# Patient Record
Sex: MALE
[De-identification: names, ages, dates, MRNs, and addresses within clinical notes are randomized per-mention and may not be internally consistent; named-entity substitution may affect disease eponyms.]

---

## 2017-05-15 NOTE — PCM.SURG1
Surgeon's Initial Post Op Note





- Surgeon's Notes


Surgeon: Edu


Assistant:  [email or web address]


Type of Anesthesia: General Endo


Pre-Operative Diagnosis: Incarcerated umbilical hernia


Operative Findings: Incarcerated umbilical hernia containing omentum


Post-Operative Diagnosis: Incarcerated umbilical hernia


Operation Performed: Primary umbilical hernia repair


Specimen/Specimens Removed: hernia sack with omentum


Estimated Blood Loss: EBL {In ML}: 5


Blood Products Given: N/A


Drains Used: No Drains


Post-Op Condition: Good


Date of Surgery/Procedure: 05/15/17


Time of Surgery/Procedure: 07:45

## 2017-05-15 NOTE — OP
PROCEDURE DATE: 05/15/2017



The patient has an umbilical hernia that is rather large.  It could not be reduced.



PREOPERATIVE DIAGNOSIS:  Umbilical hernia.



POSTOPERATIVE DIAGNOSIS:  Umbilical hernia.



OPERATION PERFORMED:  Umbilical herniorrhaphy.



SURGEON:  Hola Sorensen MD.



ASSISTANT:  The resident.



DESCRIPTION OF PROCEDURE:  In the operating room, the patient, having been identified, an infraumbili
alexandra incision was made through the skin and subcutaneous tissues that was enlarged a little bit.  This
 was taken down to the fascia, and the umbilicus was  from the stalk.  In so doing, pretty m
uch the entire hernia was dissected.  Blunt dissection went around it to complete it.  We could not r
educe it, and the sac was then opened - the sac removed with the omentum, which was cauterized as nec
essary.  This went back very nicely.  This showed it to be a very small hernia opening.  This was edinson
sed with a mattress stitch of #1 Prolene x 2.  Complete dissection was achieved.  There was nothing e
lse untoward.  Prior to tying these 2 stitches simultaneously, a finger was placed in, and there was 
no bleeding and no obstruction.  Subcutaneous tissues were closed with Vicryl.  Skin was closed with 
subcuticular,   light pressure dressing.  The patient was taken to recovery room in good condition af
ter the sponge and needle counts were declared correct.





__________________________________________

Hola Sorensen MD







cc:



DD: 05/15/2017 11:32:38  [id number]

TT: 05/15/2017 12:32:28

Job # [id number]

jn

## 2019-02-28 ENCOUNTER — HOSPITAL ENCOUNTER (INPATIENT)
Dept: HOSPITAL 42 - ED | Age: 79
LOS: 3 days | Discharge: HOME | DRG: 565 | End: 2019-03-03
Attending: INTERNAL MEDICINE | Admitting: INTERNAL MEDICINE
Payer: MEDICARE

## 2019-02-28 VITALS — BODY MASS INDEX: 34.9 KG/M2

## 2019-02-28 DIAGNOSIS — M25.462: Primary | ICD-10-CM

## 2019-02-28 DIAGNOSIS — S83.512A: ICD-10-CM

## 2019-02-28 DIAGNOSIS — I10: ICD-10-CM

## 2019-02-28 DIAGNOSIS — Z79.82: ICD-10-CM

## 2019-02-28 DIAGNOSIS — Z79.899: ICD-10-CM

## 2019-02-28 DIAGNOSIS — Z95.5: ICD-10-CM
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
## 2019-02-28 DIAGNOSIS — I25.10: ICD-10-CM

## 2019-02-28 DIAGNOSIS — Z79.02: ICD-10-CM

## 2019-02-28 DIAGNOSIS — M25.062: ICD-10-CM
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
## 2019-02-28 DIAGNOSIS — S80.02XA: ICD-10-CM

## 2019-02-28 DIAGNOSIS — I48.2: ICD-10-CM

## 2019-02-28 DIAGNOSIS — E11.9: ICD-10-CM

## 2019-02-28 DIAGNOSIS — S76.112A: ICD-10-CM

## 2019-02-28 LAB
APTT BLD: 29.4 SECONDS (ref 26.9–38.3)
BASOPHILS # BLD AUTO: 0.02 K/MM3 (ref 0–2)
BASOPHILS NFR BLD: 0.1 % (ref 0–3)
BUN SERPL-MCNC: 18 MG/DL (ref 7–21)
CALCIUM SERPL-MCNC: 7.8 MG/DL (ref 8.4–10.5)
EOSINOPHIL # BLD: 0.2 10*3/UL (ref 0–0.7)
EOSINOPHIL NFR BLD: 1.4 % (ref 1.5–5)
ERYTHROCYTE [DISTWIDTH] IN BLOOD BY AUTOMATED COUNT: 13.2 % (ref 11.5–14.5)
GFR NON-AFRICAN AMERICAN: > 60
HGB BLD-MCNC: 13.8 G/DL (ref 14–18)
INR PPP: 1.06
LYMPHOCYTES # BLD: 4.9 10*3/UL (ref 1.2–3.4)
LYMPHOCYTES NFR BLD AUTO: 32.9 % (ref 22–35)
MCH RBC QN AUTO: 30.5 PG (ref 25–35)
MCHC RBC AUTO-ENTMCNC: 34.2 G/DL (ref 31–37)
MCV RBC AUTO: 89 FL (ref 80–105)
MONOCYTES # BLD AUTO: 0.5 10*3/UL (ref 0.1–0.6)
MONOCYTES NFR BLD: 3.3 % (ref 1–6)
PLATELET # BLD: 232 10^3/UL (ref 120–450)
PMV BLD AUTO: 10.2 FL (ref 7–11)
PROTHROMBIN TIME: 11.8 SECONDS (ref 9.4–12.5)
RBC # BLD AUTO: 4.53 10^6/UL (ref 3.5–6.1)
WBC # BLD AUTO: 14.8 10^3/UL (ref 4.5–11)

## 2019-02-28 RX ADMIN — POTASSIUM CHLORIDE SCH MEQ: 20 TABLET, EXTENDED RELEASE ORAL at 23:07

## 2019-02-28 NOTE — ED PDOC
Arrival/HPI





- General


Chief Complaint: Lower Extremity Problem/Injury


Time Seen by Provider: 02/28/19 17:02


Historian: Patient, Family (Daughter at bedside helped provide information)





- History of Present Illness


Narrative History of Present Illness (Text): 





02/28/19 17:15


78 M with PMHx of falls, presents with cc of left knee pain status post slipping

and falling on ice around 16:30 prior to arrival.  Pt reports it felt like his 

knee cap "went off to the right side." Patient notes he is not able to put any 

weight on that leg. Patient notes mild pain to left upper thigh. Daughter at 

bedside notes patient is on blood thinners. Daughter notes if pain medication is

needed, to avoid Percocet because it makes patient constipated. Patient denies 

any other complaints. 





PMD: Serafin Vasquez





Time/Duration: Prior to Arrival (Patient notes slip and fall from 16:30 today 

prior to arrival)


Symptom Onset: Sudden


Symptom Course: Unchanged


Context: Slipped (Patient notes he slipped and fell on ice )





Past Medical History





- Provider Review


Nursing Documentation Reviewed: Yes





- Infectious Disease


Hx of Infectious Diseases: None





- Cardiac


Hx Hypertension: Yes


Hx Pacemaker: No


Other/Comment: Cardiac cath x 3 stents





- Neurological


Hx Paralysis: No





- Endocrine/Metabolic


Hx Diabetes Mellitus Type 2: Yes





- Hematological/Oncological


Hx Blood Transfusions: No


Hx Blood Transfusion Reaction: No





- Musculoskeletal/Rheumatological


Hx Musculoskeletal Disorders: Yes





- Psychiatric


Hx Emotional Abuse: No


Hx Physical Abuse: No


Hx Substance Use: No





- Surgical History


Other/Comment: Cardiac cath x 3 stents





- Anesthesia


Hx Anesthesia Reactions: Yes (NAUSEA/VOMITING)


Hx Malignant Hyperthermia: No





- Suicidal Assessment


Feels Threatened In Home Enviroment: No





Family/Social History





- Physician Review


Nursing Documentation Reviewed: Yes


Family/Social History: No Known Family HX


Smoking Status: Never Smoked


Hx Alcohol Use: No


Hx Substance Use: No





Allergies/Home Meds


Allergies/Adverse Reactions: 


Allergies





nitroglycerin Allergy (Intermediate, Verified 02/28/19 17:14)


   MIGRAINES








Home Medications: 


                                    Home Meds











 Medication  Instructions  Recorded  Confirmed


 


Aspirin [Ecotrin] 325 mg PO QAM 05/17/16 02/28/19


 


Atorvastatin [Lipitor] 80 mg PO DAILY 05/17/16 02/28/19


 


Carvedilol [Coreg] 6.25 mg PO BID 05/17/16 02/28/19


 


Celecoxib [Celebrex] 200 mg PO DAILY 05/17/16 02/28/19


 


Olmesartan/Amlodipin/Hcthiazid 1 tab PO QAM 05/17/16 02/28/19





[Tribenzor 5 mg-25 mg-40 mg]   


 


Potassium Chloride [Klor-Con M20] 20 meq PO QPM 05/17/16 02/28/19


 


Ranolazine [Ranexa] 500 mg PO QAM 05/17/16 02/28/19


 


Famotidine [Pepcid] 20 mg PO BID 05/18/16 02/28/19


 


Prasugrel [Effient] 1 tab PO DAILY 02/28/19 02/28/19














Review of Systems





- Physician Review


All systems were reviewed & negative as marked: Yes (All other systems negative 

except that noted in the HPI.)





Physical Exam





- Physical Exam


Narrative Physical Exam (Text): 





Gen:  VS reviewed, alert, well developed, well nourished, nontoxic, mild 

distress 


Eye: EOMI, PERRL 


Neck: no JVD, supple, no adenopathy


CV: regular rate, regular rhythm, no rubs,no murmur, S1, S2


Pulm: no distress, clear to auscultation, no wheeze, no rhonchi, breath sounds 

equal, no rales


Abd: soft, nontender, no guarding, no rebound, no rigidity


Ext: Trace edema in bilateral lower extremities. Moderate effusions of left 

knee. Limited ROM of left knee, secondary to pain. Patella seems to be midline. 

Mild tenderness of distal anterior femur.


Skin: good color, no rash, no cyanosis


Psych: responds appropriately to questions, normal affect


Neuro: oriented x3, CN2-12 intact grossly, motor intact, sensation intact





Vital Signs Reviewed: Yes





Vital Signs











  Temp Pulse Resp BP Pulse Ox


 


 02/28/19 16:49  97.8 F  61  18  160/85 H  96











Temperature: Afebrile


Blood Pressure: Hypertensive


Pulse: Regular


Respiratory Rate: Normal


Appearance: Positive for: Well-Appearing, Non-Toxic


Pain Distress: Mild


Mental Status: Positive for: Alert and Oriented X 3





Medical Decision Making


ED Course and Treatment: 





02/28/19 


Impression:


78 year old male who presents to the Emergency department for left knee pain 

status post slipping and falling on ice around 16:30 prior to arrival. 





Differential Diagnosis included but are not limited to:  





Plan:


-- EKG


-- Labs


-- Pack ice


-- X-ray of left femur


-- X-ray of left knee with patellla


-- Reassess and disposition





Prior Visits:


Notes and results from previous visits were reviewed. 





Progress Notes:











02/28/19 19:55


d/w JULIOCESAR almeida covering with dr. osorio, accept admit. patient to be 

admitted for traumatic knee effusion, unable to bear weight, fall risk and 

cannot function at home. will need ortho consult, pain control, possible PT


02/28/19 20:55


case discussed with dr. covington, will see patient in consultation





- RAD Interpretation


Narrative RAD Interpretations (Text): 





02/28/19





X-ray of left femur interpreted by me, shows: 





No fracture. 


------------------------------------------------------

-----------------------------------------------------------------


X-ray of left knee interpreted by me, shows:





No fracture or dislocation. 











Radiology Orders: 











02/28/19 17:21


Femur Left [FEMUR MIN 2 VIEWS LT] [RAD] Stat 





02/28/19 17:22


KNEE WITH PATELLA LEFT 3 VIEW [RAD] Stat 











: ED Physician





- EKG Interpretation


EKG Interpretation (Text): 





02/28/19 19:45


1754: sinus mis at 58 bpm, rbbb, nonspecific t wave abn


Interpreted by ED Physician: Yes





- Scribe Statement


The provider has reviewed the documentation as recorded by the Scribe





Hyacinth Stock 





All medical record entries made by the Scribe were at my direction and per

sonally dictated by me. I have reviewed the chart and agree that the record 

accurately reflects my personal performance of the history, physical exam, 

medical decision making, and the department course for this patient. I have also

 personally directed, reviewed, and agree with the discharge instructions and 

disposition.





Disposition/Present on Arrival





- Present on Arrival


Any Indicators Present on Arrival: No


History of DVT/PE: No


History of Uncontrolled Diabetes: No


Urinary Catheter: No


History of Decub. Ulcer: No


History Surgical Site Infection Following: None





- Disposition


Have Diagnosis and Disposition been Completed?: Yes


Diagnosis: 


 Effusion of knee





Disposition: HOSPITALIZED


Disposition Time: 19:55


Patient Plan: Admission


Condition: STABLE

## 2019-03-01 RX ADMIN — POTASSIUM CHLORIDE SCH MEQ: 20 TABLET, EXTENDED RELEASE ORAL at 18:14

## 2019-03-01 RX ADMIN — POTASSIUM CHLORIDE SCH MEQ: 20 TABLET, EXTENDED RELEASE ORAL at 11:12

## 2019-03-01 NOTE — CARD
--------------- APPROVED REPORT --------------





Date of service: 02/28/2019



EKG Measurement

Heart [id number]

LA [id number]

[id number]

[id number]

[id number]



<Conclusion>

Sinus bradycardia with sinus arrhythmia with 1st degree AV block

Right bundle branch block

Abnormal ECG

## 2019-03-01 NOTE — RAD
Date of service: 



02/28/2019



PROCEDURE:  Left Femur Radiographs.



HISTORY:

fall, injury



COMPARISON:

None.



TECHNIQUE:

AP and Lateral Radiographs of the left femur.



FINDINGS:



FEMUR:

Normal. No fracture. 



SOFT TISSUES:

Normal. 



OTHER FINDINGS:

None.



IMPRESSION:

Unremarkable radiographs of the left femur.

## 2019-03-01 NOTE — RAD
Date of service: 



02/28/2019



PROCEDURE:  Left Knee Radiographs.



HISTORY:

Pain.



COMPARISON:

None.



FINDINGS:



BONES:

Normal. No fracture. 



JOINTS:

Normal. No osteoarthritis. 



JOINT EFFUSION:

None. 



OTHER FINDINGS:

Soft tissue swelling above the patella



IMPRESSION:

No evidence of fracture

## 2019-03-01 NOTE — HP
DATE OF EXAM:  03/01/2019



HISTORY OF PRESENT ILLNESS:  A 78-year-old white male with history of

hypertension, CAD, status post multiple stents, history of gout and

degenerative arthritis.  The patient recently found on day of admission,

slipping on the ice and  injuring his left knee.  The patient was unable to

ambulate after the fall, then he started to swell traumatically,

eventually, the patient because was unable to ambulate was brought to the

hospital for evaluation.  The patient is unable to bear weight and

complaining of pain and swelling in the left knee.



REVIEW OF SYSTEMS:

CARDIAC:  Negative for syncope, palpitations, chest pain, shortness of

breath.

RESPIRATORY:  Negative for cough, sputum production or dyspnea.

NEUROLOGICAL:  Negative for lightheadedness, dizziness, headache or

syncope.

GI:  Negative for nausea, vomiting and diarrhea.  He is negative for

dysuria, hematuria and frequency.

MUSCULOSKELETAL:  Positive for pain and swelling of the left knee.  The

patient also recently had an episode of infection versus gout of the third

toe of the left foot.



SOCIAL HISTORY:  The patient is a nonsmoker and nondrinker.



MEDICATIONS:  He is on multiple medications including Effient and aspirin

for his coronary artery disease.  _____



He has no travel history.



FAMILY HISTORY:  There is no family history that is pertinent.



PHYSICAL EXAMINATION

GENERAL:  Shows well-developed,but obese white male in mild distress.

HEENT:  Essentially within normal limits.

HEART:  Regular sinus rhythm.  No S3 or murmurs.

CHEST:  Clear to auscultation and percussion.

ABDOMEN:  Obese, but benign.

EXTREMITIES:  Without cyanosis, clubbing or edema.  There is healing

erythema of the third toe of the left foot.  There is marked swelling,

ecchymosis and fluctuance of the left knee without _____ and/or erythema. 

There is decreased range of motion.  There is some crepitus on movement of

the knee.



IMPRESSION AND PLAN:  Contusion of the left knee with marked swelling and

bleeding, the patient is on blood thinners.  The patient has a history of

surgery of his left shoulder and arthroscopic surgery of his right knee and

he also had other viscous injection, _____ injections of his left knee. 

Rule out fracture, rule out internal derangement, rule out hemarthrosis of

the knee.







__________________________________________

Serafin Puga MD





DD:  03/01/2019 8:03:06

DT:  03/01/2019 8:41:10

Job # [id number]

## 2019-03-01 NOTE — CON
DATE:  03/01/2019



INPATIENT CONSULTATION NOTE



REASON FOR CONSULTATION:  Status post fall with left knee pain and

effusion.



HISTORY OF PRESENT ILLNESS:  This is a 78-year-old gentleman who yesterday

slipped and fell injuring his left knee.  He said his left leg appear to be

twisted to the side and felt like his knee cap was to the side. 

Subsequently, he went to the hospital via EMS and was noted to have a very

large effusion.  The patient denies any other injuries.  The patient states

he is unable to bear weight on it secondary to pain.



PHYSICAL EXAMINATION:

GENERAL:  This is a gentleman in no apparent distress.  He is awake, alert,

and oriented x3.

EXTREMITIES:  Evaluation of the left knee shows a large suprapatellar

effusion.  He does have some ecchymosis anteriorly.  He has some tenderness

along the proximal aspect of the patella and the medial aspect of the

patella as well.  He is unable to perform a straight leg raise.  He is

grossly stable with varus valgus test.  Grossly stable with Lachman.  His

thigh and calf are soft and nontender.  Neurovascularly, he is grossly

intact.  He has palpable pedal pulses.



DIAGNOSTIC DATA:  X-ray and CAT scan of the left knee showed no acute

fracture or dislocation.  The patella does not appear to be high or _____

low consistent with either patella or quadriceps tendon tear.  Some

degenerative changes are noted.



ASSESSMENT:  Status post fall with left knee effusion.



PLAN:  At this point, I recommended ice and knee mobilizer.  Also,

recommended weightbearing as tolerated and we will order an MRI of the left

knee.  We will followup.







__________________________________________

David Bowman MD





DD:  03/01/2019 14:13:20

DT:  03/01/2019 19:07:40

Job # [id number]

## 2019-03-01 NOTE — CP.PCM.PN
Subjective





- Date & Time of Evaluation


Date of Evaluation: 03/01/19


Time of Evaluation: 14:19





- Subjective


Subjective: 





Patient has been afebrile. Tolerating therapy well. Plan to go home tomorrow





Dressings changed. Incision clean dry and intact with sutures in place. No 

erythema or drainage. Calf and thigh soft and nontender. NVI distally





s/p R TKA


Cont PT


Cont DVT prophylaxis


Discharge tomorrow to home with home PT or outpatient therapy


Patient to follow up in office on Thursday








Objective





- Vital Signs/Intake and Output


Vital Signs (last 24 hours): 


                                        











Temp Pulse Resp BP Pulse Ox


 


 97.3 F L  61   20   155/88 H  93 L


 


 03/01/19 08:00  03/01/19 11:13  03/01/19 08:00  03/01/19 11:13  03/01/19 08:00











- Medications


Medications: 


                               Current Medications





Amlodipine Besylate (Norvasc)  5 mg PO DAILY [facility]


   Last Admin: 03/01/19 11:12 Dose:  5 mg


Atorvastatin Calcium (Lipitor)  80 mg PO DAILY [facility]


   Last Admin: 03/01/19 11:14 Dose:  80 mg


Carvedilol (Coreg)  6.25 mg PO BID [facility]


   Last Admin: 03/01/19 11:13 Dose:  6.25 mg


Famotidine (Pepcid)  20 mg PO BID [facility]


   Last Admin: 03/01/19 11:14 Dose:  20 mg


Hydrochlorothiazide (Hydrodiuril)  25 mg PO DAILY [facility]


   Last Admin: 03/01/19 11:14 Dose:  25 mg


Losartan Potassium (Cozaar)  100 mg PO DAILY [facility]


   Last Admin: 03/01/19 11:12 Dose:  100 mg


Non-Formulary Medication (Ranolazine [Ranexa])  500 mg PO QAM [facility]


Potassium Chloride (K-Dur 20 Meq Er Tab)  20 meq PO DAILY [facility]


   Last Admin: 03/01/19 11:12 Dose:  20 meq


Potassium Chloride (K-Dur 20 Meq Er Tab)  20 meq PO QPM [facility]


Tramadol HCl (Ultram)  50 mg PO TID PRN


   PRN Reason: Pain, severe (8-10)


   Last Admin: 03/01/19 05:23 Dose:  50 mg











- Labs


Labs: 


                                        





                                 02/28/19 19:07 





                                 02/28/19 19:07 





                                        











PT  11.8 SECONDS (9.4-12.5)   02/28/19  19:07    


 


INR  1.06   02/28/19  19:07    


 


APTT  29.4 Seconds (26.9-38.3)   02/28/19  19:07

## 2019-03-01 NOTE — CT
Date of service: 



02/28/2019



PROCEDURE:  



HISTORY:

trauma



COMPARISON:





TECHNIQUE:





FINDINGS:

No acute fracture.  Marked anterior prepatellar soft tissue swelling 

as well as a large joint effusion.  Mild-to-moderate tricompartmental 

osteoarthritis.  Cannot exclude a partial tear of the quadriceps 

tendon.  Vascular calcifications. 



IMPRESSION:

No acute fracture.  Large joint effusion and prepatellar soft tissue 

swelling.

## 2019-03-02 LAB
ALBUMIN SERPL-MCNC: 3.5 G/DL (ref 3–4.8)
ALBUMIN/GLOB SERPL: 1.4 {RATIO} (ref 1.1–1.8)
ALT SERPL-CCNC: 14 U/L (ref 7–56)
AST SERPL-CCNC: 30 U/L (ref 17–59)
BUN SERPL-MCNC: 25 MG/DL (ref 7–21)
CALCIUM SERPL-MCNC: 9.4 MG/DL (ref 8.4–10.5)
ERYTHROCYTE [DISTWIDTH] IN BLOOD BY AUTOMATED COUNT: 13.6 % (ref 11.5–14.5)
GFR NON-AFRICAN AMERICAN: 54
HGB BLD-MCNC: 12 G/DL (ref 14–18)
MCH RBC QN AUTO: 29.9 PG (ref 25–35)
MCHC RBC AUTO-ENTMCNC: 33 G/DL (ref 31–37)
MCV RBC AUTO: 90.8 FL (ref 80–105)
PLATELET # BLD: 217 10^3/UL (ref 120–450)
PMV BLD AUTO: 10.4 FL (ref 7–11)
RBC # BLD AUTO: 4.01 10^6/UL (ref 3.5–6.1)
WBC # BLD AUTO: 10.8 10^3/UL (ref 4.5–11)

## 2019-03-02 RX ADMIN — POTASSIUM CHLORIDE SCH MEQ: 20 TABLET, EXTENDED RELEASE ORAL at 18:41

## 2019-03-02 RX ADMIN — POTASSIUM CHLORIDE SCH MEQ: 20 TABLET, EXTENDED RELEASE ORAL at 09:35

## 2019-03-02 NOTE — PN
DATE:  03/02/2019





SUBJECTIVE:  A 78-year-old white male, status post fall, injuring the left

knee.  The patient was seen in consultation with Dr. Guzman.  He had

attempted arthrocentesis without significant effusion drained.  CT showed

large effusion of the knee without fracture.  The patient has marked

swelling and erythema of the left knee and surrounding soft tissues and

some swelling.  He has minimal pain.  The patient is having an MRI today to

rule out a tear of the quadriceps muscle.  The patient did start physical

therapy and occupational therapy and was also taught to use crutches and do

stairs.  He will be discharged home if the MRI is negative.  He will follow

as an outpatient _____.







__________________________________________

Serafin Puga MD





DD:  03/02/2019 9:03:10

DT:  03/02/2019 10:00:38

Job # [id number]

## 2019-03-03 VITALS
TEMPERATURE: 97.9 F | DIASTOLIC BLOOD PRESSURE: 69 MMHG | OXYGEN SATURATION: 95 % | SYSTOLIC BLOOD PRESSURE: 130 MMHG | RESPIRATION RATE: 18 BRPM | HEART RATE: 60 BPM

## 2019-03-03 RX ADMIN — POTASSIUM CHLORIDE SCH MEQ: 20 TABLET, EXTENDED RELEASE ORAL at 10:24

## 2019-03-03 NOTE — DS
HISTORY OF PRESENT ILLNESS:  A 78-year-old white male with history of

chronic atrial fibrillation, hypertension, CAD status post stents, status

post fall with trauma to the left knee.  He was seen in consultation with

Dr. Bowman.  The patient had a CT and also an MRI, which revealed a full

thickness ACL tear and a tear of the patellar tendon.  The patient has

marked swelling and erythema.  The patient had been on Effient because of

his chronic atrial fibrillation.  Dr. Bowman attempted a drainage, but

there was no blood or fluid in the joint.  The patient was started on

physical therapy, occupational therapy, had severe pain and difficulty

ambulating.  The patient will be scheduling for surgery now and he could be

discharged home today with a walker and crutches.  He does have some _____

at home.  He will hold his Effient until the surgery and he will be

followed as out for preoperative surgery later on this week.



DISCHARGE DIAGNOSES:

1.  Fall resulting in tear of the left anterior cruciate ligament and

patellar tendon.

2.  Hemarthrosis of the left knee.

3.  Chronic atrial fibrillation.

4.  Coronary artery disease.







__________________________________________

Serafin Puga MD





DD:  03/03/2019 11:32:17

DT:  03/03/2019 15:05:25

Job # [id number]

## 2019-03-03 NOTE — RAD
HISTORY:

 pre-op 



COMPARISON:

Chest x-ray performed 5/2/17 



TECHNIQUE:

Chest, one view.



FINDINGS:

Examination limited by habitus and hypoinflation.



LUNGS:

Right basilar atelectasis.



Please note that chest x-ray has limited sensitivity for the 

detection of pulmonary masses.



PLEURA:

No significant pleural effusion identified. No definite pneumothorax .



CARDIOVASCULAR:

Heart size appears within normal limits.  Atherosclerotic 

calcifications aorta.  The patient's chin obscures evaluation of lung 

apices. 



OSSEOUS STRUCTURES:

Degenerative changes.



VISUALIZED UPPER ABDOMEN:

Elevation of the right hemidiaphragm



OTHER FINDINGS:

None.



IMPRESSION:

Right basilar atelectasis.

## 2019-03-04 NOTE — MRI
Date of service: 



03/02/2019



PROCEDURE:  MRI Left Knee



HISTORY:

Pain. 



COMPARISON:

None available. 



TECHNIQUE:

Multiecho multiplanar sequences were performed through the left knee.



FINDINGS:



ANTERIOR CRUCIATE LIGAMENT::

Intact. 



POSTERIOR CRUCIATE LIGAMENT::

Intact. 



MEDIAL MENISCUS::

There is a tear in the body and posterior horn of the medial 

meniscus. 



LATERAL MENISCUS::

Intact. 



MEDIAL COLLATERAL LIGAMENT::

Intact. 



LATERAL COLLATERAL LIGAMENT COMPLEX::

Intact. 



QUADRICEPS TENDON::

There is a complete tear of the quadriceps tendon just above the 

patella.  There is a large amount of soft tissue swelling and edema. 



PATELLAR TENDON::

Intact. 



CARTILAGE::

Thinning of the patellar cartilage 



JOINT FLUID::

Small joint effusion 



OSSEOUS STRUCTURES::

Intact. 



OTHER FINDINGS:

The report concurs with the preliminary [facility] report



IMPRESSION:

There is a complete tear of the quadriceps tendon just above the 

patella.  There is a large amount of soft tissue swelling and edema.



There is a tear in the body and posterior horn of the medial meniscus.

## 2019-03-08 ENCOUNTER — HOSPITAL ENCOUNTER (OUTPATIENT)
Dept: HOSPITAL 42 - SDS | Age: 79
End: 2019-03-08
Payer: MEDICARE

## 2019-03-08 DIAGNOSIS — S76.112A: Primary | ICD-10-CM

## 2021-05-17 NOTE — CP.PCM.PCO
Here is the plan from today's visit    1. Acute prostatitis  Please call urology office to see if the surgery should be delayed.   - levofloxacin (LEVAQUIN) 500 MG tablet; Take 1 tablet (500 mg) by mouth daily  Dispense: 42 tablet; Refill: 0  - Urine Culture Aerobic Bacterial      Please call or return to clinic if your symptoms don't go away.            Thank you for coming to [facility] today.  Lab Testing:  **If you had lab testing today and your results are reassuring or normal they will be mailed to you or sent through [employer] within 7 days.   **If the lab tests need quick action we will call you with the results.  The phone number we will call with results is # [phone] (home) . If this is not the best number please call our clinic and change the number.  Medication Refills:  If you need any refills please call your pharmacy and they will contact us.   If you need to  your refill at a new pharmacy, please contact the new pharmacy directly. The new pharmacy will help you get your medications transferred faster.   Scheduling:  If you have any concerns about today's visit or wish to schedule another appointment please call our office during normal business hours [phone] (8-5:00 M-F)   eferrals to [facility] please call [phone].   Mammogram Scheduling [phone]     XRay/CT/Ultrasound/MRI Scheduling [phone]    Medical Concerns:  If you have urgent medical concerns please call [phone] at any time of the day.    Urban Perry MD     Physician Communication Note





- Physician Communication Note


Physician Communication Note: s/p drainage left knee effusion,awaiting MRI left 

knee r/o quad tear,PT [place]

## 2022-05-17 ENCOUNTER — NEW PATIENT COMPREHENSIVE (OUTPATIENT)
Dept: URBAN - METROPOLITAN AREA CLINIC 21 | Facility: CLINIC | Age: 82
End: 2022-05-17

## 2022-05-17 DIAGNOSIS — H43.393: ICD-10-CM

## 2022-05-17 DIAGNOSIS — H25.13: ICD-10-CM

## 2022-05-17 DIAGNOSIS — H35.033: ICD-10-CM

## 2022-05-17 DIAGNOSIS — H35.3130: ICD-10-CM

## 2022-05-17 PROCEDURE — 92134 CPTRZ OPH DX IMG PST SGM RTA: CPT

## 2022-05-17 PROCEDURE — 92020 GONIOSCOPY: CPT

## 2022-05-17 PROCEDURE — 92004 COMPRE OPH EXAM NEW PT 1/>: CPT

## 2022-05-17 PROCEDURE — 92136 OPHTHALMIC BIOMETRY: CPT

## 2022-05-17 PROCEDURE — 92201 OPSCPY EXTND RTA DRAW UNI/BI: CPT

## 2022-05-17 ASSESSMENT — VISUAL ACUITY
OD_PH: 20/40
OS_CC: 20/40
OD_CC: J1+/-
OS_PH: 20/30
OD_CC: 20/80
OS_CC: J1+

## 2022-05-17 ASSESSMENT — TONOMETRY
OD_IOP_MMHG: 20
OS_IOP_MMHG: 19

## 2022-11-21 ENCOUNTER — SURGERY/PROCEDURE (OUTPATIENT)
Dept: URBAN - METROPOLITAN AREA SURGICAL CENTER 32 | Facility: SURGICAL CENTER | Age: 82
End: 2022-11-21

## 2022-11-21 DIAGNOSIS — H25.11: ICD-10-CM

## 2022-11-21 PROBLEM — Z96.1: Noted: 2022-11-21

## 2022-11-21 PROBLEM — Z96.1 PSEUDOPHAKIA OD: Noted: 2022-11-21

## 2022-11-21 PROCEDURE — 66984 XCAPSL CTRC RMVL W/O ECP: CPT

## 2022-11-22 ENCOUNTER — 1 DAY POST-OP (OUTPATIENT)
Dept: URBAN - METROPOLITAN AREA CLINIC 21 | Facility: CLINIC | Age: 82
End: 2022-11-22

## 2022-11-22 DIAGNOSIS — Z96.1: ICD-10-CM

## 2022-11-22 PROCEDURE — 99024 POSTOP FOLLOW-UP VISIT: CPT

## 2022-11-22 ASSESSMENT — TONOMETRY
OD_IOP_MMHG: 19
OS_IOP_MMHG: 18

## 2022-11-22 ASSESSMENT — VISUAL ACUITY: OD_SC: 20/30+2

## 2022-11-29 ENCOUNTER — 1 WEEK POST-OP (OUTPATIENT)
Dept: URBAN - METROPOLITAN AREA CLINIC 21 | Facility: CLINIC | Age: 82
End: 2022-11-29

## 2022-11-29 DIAGNOSIS — Z96.1: ICD-10-CM

## 2022-11-29 PROCEDURE — 99024 POSTOP FOLLOW-UP VISIT: CPT

## 2022-11-29 ASSESSMENT — VISUAL ACUITY
OS_SC: 20/40
OD_SC: 20/30-3

## 2022-11-29 ASSESSMENT — TONOMETRY
OS_IOP_MMHG: 24
OD_IOP_MMHG: 18

## 2022-12-06 ENCOUNTER — POST-OP CHECK (OUTPATIENT)
Dept: URBAN - METROPOLITAN AREA CLINIC 21 | Facility: CLINIC | Age: 82
End: 2022-12-06

## 2022-12-06 DIAGNOSIS — Z96.1: ICD-10-CM

## 2022-12-06 PROCEDURE — 99024 POSTOP FOLLOW-UP VISIT: CPT

## 2022-12-06 ASSESSMENT — TONOMETRY
OS_IOP_MMHG: 18
OD_IOP_MMHG: 18

## 2022-12-06 ASSESSMENT — VISUAL ACUITY
OS_SC: 20/50
OD_SC: 20/40+
OD_SC: J10
OS_SC: J16

## 2022-12-13 ENCOUNTER — ESTABLISHED (OUTPATIENT)
Dept: URBAN - METROPOLITAN AREA CLINIC 21 | Facility: CLINIC | Age: 82
End: 2022-12-13

## 2022-12-13 DIAGNOSIS — Z96.1: ICD-10-CM

## 2022-12-13 PROCEDURE — 99024 POSTOP FOLLOW-UP VISIT: CPT

## 2022-12-13 ASSESSMENT — TONOMETRY
OD_IOP_MMHG: 16
OS_IOP_MMHG: 19

## 2022-12-13 ASSESSMENT — VISUAL ACUITY
OS_SC: 20/50+3
OD_SC: 20/30+2

## 2023-01-23 ENCOUNTER — EMERGENCY VISIT (OUTPATIENT)
Dept: URBAN - METROPOLITAN AREA CLINIC 21 | Facility: CLINIC | Age: 83
End: 2023-01-23

## 2023-01-23 DIAGNOSIS — Z96.1: ICD-10-CM

## 2023-01-23 DIAGNOSIS — H11.32: ICD-10-CM

## 2023-01-23 PROCEDURE — 92012 INTRM OPH EXAM EST PATIENT: CPT

## 2023-01-23 ASSESSMENT — TONOMETRY
OD_IOP_MMHG: 13
OS_IOP_MMHG: 15

## 2023-01-23 ASSESSMENT — VISUAL ACUITY
OD_SC: 20/30+2
OS_SC: 20/50+2

## 2023-03-10 ENCOUNTER — ESTABLISHED (OUTPATIENT)
Dept: URBAN - METROPOLITAN AREA CLINIC 21 | Facility: CLINIC | Age: 83
End: 2023-03-10

## 2023-03-10 DIAGNOSIS — H25.12: ICD-10-CM

## 2023-03-10 DIAGNOSIS — Z96.1: ICD-10-CM

## 2023-03-10 PROCEDURE — 92012 INTRM OPH EXAM EST PATIENT: CPT

## 2023-03-10 PROCEDURE — 92136 OPHTHALMIC BIOMETRY: CPT

## 2023-03-10 ASSESSMENT — TONOMETRY
OS_IOP_MMHG: 15
OD_IOP_MMHG: 12

## 2023-03-10 ASSESSMENT — VISUAL ACUITY
OD_SC: 20/30+3
OS_SC: 20/40-2

## 2023-04-17 ENCOUNTER — SURGERY/PROCEDURE (OUTPATIENT)
Dept: URBAN - METROPOLITAN AREA SURGICAL CENTER 32 | Facility: SURGICAL CENTER | Age: 83
End: 2023-04-17

## 2023-04-17 DIAGNOSIS — H25.12: ICD-10-CM

## 2023-04-17 PROBLEM — Z96.1: Noted: 2023-04-17

## 2023-04-17 PROCEDURE — 66984 XCAPSL CTRC RMVL W/O ECP: CPT

## 2023-04-18 ENCOUNTER — 1 DAY POST-OP (OUTPATIENT)
Dept: URBAN - METROPOLITAN AREA CLINIC 21 | Facility: CLINIC | Age: 83
End: 2023-04-18

## 2023-04-18 DIAGNOSIS — Z96.1: ICD-10-CM

## 2023-04-18 PROCEDURE — 99024 POSTOP FOLLOW-UP VISIT: CPT

## 2023-04-18 ASSESSMENT — VISUAL ACUITY
OD_PH: 20/50
OD_SC: 20/50
OS_SC: 20/40

## 2023-04-18 ASSESSMENT — TONOMETRY: OS_IOP_MMHG: 14

## 2023-04-25 ENCOUNTER — 1 WEEK POST-OP (OUTPATIENT)
Dept: URBAN - METROPOLITAN AREA CLINIC 21 | Facility: CLINIC | Age: 83
End: 2023-04-25

## 2023-04-25 DIAGNOSIS — Z96.1: ICD-10-CM

## 2023-04-25 PROCEDURE — 99024 POSTOP FOLLOW-UP VISIT: CPT

## 2023-04-25 ASSESSMENT — TONOMETRY
OD_IOP_MMHG: 16
OS_IOP_MMHG: 15

## 2023-04-25 ASSESSMENT — VISUAL ACUITY
OD_SC: 20/25-2
OS_SC: 20/40+1

## 2023-05-16 ENCOUNTER — 1 MONTH POST-OP (OUTPATIENT)
Dept: URBAN - METROPOLITAN AREA CLINIC 21 | Facility: CLINIC | Age: 83
End: 2023-05-16

## 2023-05-16 DIAGNOSIS — Z96.1: ICD-10-CM

## 2023-05-16 PROCEDURE — 99024 POSTOP FOLLOW-UP VISIT: CPT

## 2023-05-16 ASSESSMENT — TONOMETRY
OS_IOP_MMHG: 16
OD_IOP_MMHG: 15

## 2023-05-16 ASSESSMENT — VISUAL ACUITY
OD_SC: 20/25-1
OU_SC: J2
OS_SC: 20/40-1

## 2023-11-14 ENCOUNTER — ESTABLISHED COMPREHENSIVE EXAM (OUTPATIENT)
Dept: URBAN - METROPOLITAN AREA CLINIC 21 | Facility: CLINIC | Age: 83
End: 2023-11-14

## 2023-11-14 DIAGNOSIS — H35.033: ICD-10-CM

## 2023-11-14 DIAGNOSIS — H43.393: ICD-10-CM

## 2023-11-14 DIAGNOSIS — E11.9: ICD-10-CM

## 2023-11-14 DIAGNOSIS — H35.3130: ICD-10-CM

## 2023-11-14 PROCEDURE — 92250 FUNDUS PHOTOGRAPHY W/I&R: CPT

## 2023-11-14 PROCEDURE — 92014 COMPRE OPH EXAM EST PT 1/>: CPT

## 2023-11-14 PROCEDURE — 92020 GONIOSCOPY: CPT

## 2023-11-14 ASSESSMENT — VISUAL ACUITY
OU_CC: J1+
OS_SC: 20/25
OD_SC: 20/25

## 2023-11-14 ASSESSMENT — TONOMETRY
OS_IOP_MMHG: 14
OD_IOP_MMHG: 14

## 2024-10-14 ENCOUNTER — PROBLEM (OUTPATIENT)
Dept: URBAN - METROPOLITAN AREA CLINIC 21 | Facility: CLINIC | Age: 84
End: 2024-10-14

## 2024-10-14 DIAGNOSIS — D23.111: ICD-10-CM

## 2024-10-14 PROCEDURE — 92012 INTRM OPH EXAM EST PATIENT: CPT

## 2024-10-14 ASSESSMENT — TONOMETRY
OS_IOP_MMHG: 14
OD_IOP_MMHG: 14

## 2024-10-14 ASSESSMENT — VISUAL ACUITY
OD_SC: 20/30
OS_SC: 20/30

## 2024-11-01 ENCOUNTER — FOLLOW UP (OUTPATIENT)
Dept: URBAN - METROPOLITAN AREA CLINIC 21 | Facility: CLINIC | Age: 84
End: 2024-11-01

## 2024-11-01 DIAGNOSIS — D23.111: ICD-10-CM

## 2024-11-01 PROCEDURE — 99213 OFFICE O/P EST LOW 20 MIN: CPT | Mod: 25

## 2024-11-01 PROCEDURE — 67840 REMOVE EYELID LESION: CPT

## 2024-11-01 ASSESSMENT — TONOMETRY
OD_IOP_MMHG: 11
OS_IOP_MMHG: 13

## 2024-11-01 ASSESSMENT — VISUAL ACUITY
OU_CC: J1+
OS_SC: 20/25-2
OD_SC: 20/25-2

## (undated) RX ORDER — NEOMYCIN SULFATE, POLYMYXIN B SULFATE AND DEXAMETHASONE 3.5; 10000; 1 MG/G; [USP'U]/G; MG/G
1/4 OINTMENT OPHTHALMIC
Start: 2024-10-14